# Patient Record
Sex: MALE | Race: WHITE | Employment: FULL TIME | URBAN - METROPOLITAN AREA
[De-identification: names, ages, dates, MRNs, and addresses within clinical notes are randomized per-mention and may not be internally consistent; named-entity substitution may affect disease eponyms.]

---

## 2020-10-28 ENCOUNTER — OFFICE VISIT (OUTPATIENT)
Dept: PEDIATRICS | Facility: CLINIC | Age: 40
End: 2020-10-28
Payer: COMMERCIAL

## 2020-10-28 VITALS
TEMPERATURE: 98 F | HEART RATE: 69 BPM | OXYGEN SATURATION: 96 % | WEIGHT: 216.6 LBS | BODY MASS INDEX: 32.08 KG/M2 | SYSTOLIC BLOOD PRESSURE: 122 MMHG | DIASTOLIC BLOOD PRESSURE: 74 MMHG | HEIGHT: 69 IN

## 2020-10-28 DIAGNOSIS — J45.30 MILD PERSISTENT ASTHMA WITHOUT COMPLICATION: Primary | ICD-10-CM

## 2020-10-28 DIAGNOSIS — R06.02 SOB (SHORTNESS OF BREATH): ICD-10-CM

## 2020-10-28 PROCEDURE — 99203 OFFICE O/P NEW LOW 30 MIN: CPT | Performed by: FAMILY MEDICINE

## 2020-10-28 RX ORDER — ALBUTEROL SULFATE 90 UG/1
AEROSOL, METERED RESPIRATORY (INHALATION)
Qty: 1 INHALER | Refills: 1 | Status: SHIPPED | OUTPATIENT
Start: 2020-10-28 | End: 2021-06-28

## 2020-10-28 RX ORDER — ALBUTEROL SULFATE 90 UG/1
AEROSOL, METERED RESPIRATORY (INHALATION)
COMMUNITY
Start: 2020-05-22 | End: 2020-10-28

## 2020-10-28 SDOH — HEALTH STABILITY: MENTAL HEALTH: HOW MANY STANDARD DRINKS CONTAINING ALCOHOL DO YOU HAVE ON A TYPICAL DAY?: 1 OR 2

## 2020-10-28 SDOH — HEALTH STABILITY: MENTAL HEALTH: HOW OFTEN DO YOU HAVE A DRINK CONTAINING ALCOHOL?: MONTHLY OR LESS

## 2020-10-28 SDOH — HEALTH STABILITY: MENTAL HEALTH: HOW OFTEN DO YOU HAVE 6 OR MORE DRINKS ON ONE OCCASION?: NOT ASKED

## 2020-10-28 ASSESSMENT — MIFFLIN-ST. JEOR: SCORE: 1896.83

## 2020-10-28 NOTE — PATIENT INSTRUCTIONS
Schedule for XRAY chest  Start on QVAR inhaler 2 puffs twice daily  Schedule for recheck in 4-5 weeks

## 2020-10-28 NOTE — PROGRESS NOTES
Subjective     Deuce Nguyen is a 40 year old male who presents to clinic today for the following health issues:    HPI   Establishing Care/Would like to discuss Asthma and needs a refill on Albuterol Inhaler      Deuce Nguyen is new to the provider, is here to establish care and follow-up on his asthma  He has history of asthma since age 15  Has used steroid inhalers few times in the past, but not using it in the past few years due to high cost of the medication.  He is currently using albuterol inhaler almost twice a day.  Patient reported that he was using the steroid inhaler when he found it less [place]  He denies underlying history of allergies, wheezing but feels short of breath intermittently, though it is temporarily relieved with the albuterol inhaler  He also notes that he is breathing concern does not affect his routine activities or physical exercises or sleep.  Denies heartburn, reflux, chest pain, anxiety, underlying history of anemia  Has family history of lung cancer in mother and coronary artery disease in father  Patient denies history of smoking    Asthma Follow-Up    Was ACT completed today?  Yes  No flowsheet data found.       How many days per week do you miss taking your asthma controller medication?  I do not have an asthma controller medication    Please describe any recent triggers for your asthma: None    Have you had any Emergency Room Visits, Urgent Care Visits, or Hospital Admissions since your last office visit?  No      How many servings of fruits and vegetables do you eat daily?  2-3    On average, how many sweetened beverages do you drink each day (Examples: soda, juice, sweet tea, etc.  Do NOT count diet or artificially sweetened beverages)?   0    How many days per week do you exercise enough to make your heart beat faster? 3 or less    How many minutes a day do you exercise enough to make your heart beat faster? 30 - 60    How many days per  "week do you miss taking your medication? 0    -Asthma/ Albuterol Inhaler refill.     Review of Systems   CONSTITUTIONAL: NEGATIVE for fever, chills, change in weight  EYES: NEGATIVE for vision changes or irritation  ENT/MOUTH: NEGATIVE for ear, mouth and throat problems  RESP:as above  CV: NEGATIVE for chest pain, palpitations or peripheral edema  GI: NEGATIVE for nausea, abdominal pain, heartburn, or change in bowel habits  PSYCHIATRIC: NEGATIVE for changes in mood or affect      Objective    /74 (BP Location: Right arm, Patient Position: Sitting, Cuff Size: Adult Large)   Pulse 69   Temp 98  F (36.7  C) (Temporal)   Ht 1.759 m (5' 9.25\")   Wt 98.2 kg (216 lb 9.6 oz)   SpO2 96%   BMI 31.76 kg/m    Body mass index is 31.76 kg/m .  Physical Exam   GENERAL: healthy, alert and no distress  HENT: normal cephalic/atraumatic, oropharynx clear and oral mucous membranes moist  RESP: lungs clear to auscultation - no rales, rhonchi or wheezes  CV: regular rate and rhythm, normal S1 S2, no S3 or S4, no murmur, click or rub, no peripheral edema and peripheral pulses strong  PSYCH: mentation appears normal, affect normal/bright            Assessment & Plan     Mild persistent asthma without complication  Likely causing his ongoing shortness of breath, needing improvement in asthma  Recommended patient to start on Qvar inhaler, we will see if insurance covers it if not we will consider alternate options  Continue with albuterol inhaler as needed for acute bronchospasm  Recommend to follow-up in 4 weeks for recheck  Patient verbalised understanding and is agreeable to the plan.    - beclomethasone HFA (QVAR REDIHALER) 40 MCG/ACT inhaler; Inhale 2 puffs into the lungs 2 times daily  - XR Chest 2 Views; Future    SOB (shortness of breath)  Likely from uncontrolled asthma  But given his ongoing symptoms and normal lung exam, recommended chest x-ray for further evaluation  Patient is concerned about insurance coverage, " "wants to check with insurance before getting the imaging done.  - XR Chest 2 Views; Future     BMI:   Estimated body mass index is 31.76 kg/m  as calculated from the following:    Height as of this encounter: 1.759 m (5' 9.25\").    Weight as of this encounter: 98.2 kg (216 lb 9.6 oz).            Chart documentation done in part with Dragon Voice recognition Software. Although reviewed after completion, some word and grammatical error may remain.    See Patient Instructions    No follow-ups on file.    Sneha Ni MD  [facility]    "

## 2020-10-29 ASSESSMENT — ASTHMA QUESTIONNAIRES: ACT_TOTALSCORE: 16

## 2020-11-02 RX ORDER — ALBUTEROL SULFATE 90 UG/1
AEROSOL, METERED RESPIRATORY (INHALATION)
Qty: 18 INHALER | OUTPATIENT
Start: 2020-11-02

## 2020-12-17 ENCOUNTER — OFFICE VISIT (OUTPATIENT)
Dept: FAMILY MEDICINE | Facility: CLINIC | Age: 40
End: 2020-12-17
Payer: COMMERCIAL

## 2020-12-17 VITALS — DIASTOLIC BLOOD PRESSURE: 62 MMHG | SYSTOLIC BLOOD PRESSURE: 122 MMHG

## 2020-12-17 DIAGNOSIS — A63.0 GENITAL WARTS: Primary | ICD-10-CM

## 2020-12-17 PROCEDURE — 99213 OFFICE O/P EST LOW 20 MIN: CPT | Mod: 25 | Performed by: PHYSICIAN ASSISTANT

## 2020-12-17 PROCEDURE — 54056 CRYOSURGERY PENIS LESION(S): CPT | Performed by: PHYSICIAN ASSISTANT

## 2020-12-17 NOTE — PROGRESS NOTES
HPI:  Deuce Nguyen is a 40 year old male patient here today for warts on penis .  Patient states this has been present for on and off for 10 years.  Patient reports the following symptoms: painful at times .  Patient reports the following previous treatments: ln2 with improvement but recurs. Topical but didn't work.  Patient reports the following modifying factors: none.  Associated symptoms: none.  Patient has no other skin complaints today.  Remainder of the HPI, Meds, PMH, Allergies, FH, and SH was reviewed in chart.      Past Medical History:   Diagnosis Date     Asthma        Past Surgical History:   Procedure Laterality Date     APPENDECTOMY       HERNIA REPAIR, INGUINAL RT/LT       WRIST SURGERY          Family History   Problem Relation Age of Onset     Lung Cancer Mother      Dementia Mother      Coronary Artery Disease Father      Alzheimer Disease Maternal Grandmother      Alzheimer Disease Paternal Grandmother        Social History     Socioeconomic History     Marital status: Single     Spouse name: Not on file     Number of children: Not on file     Years of education: Not on file     Highest education level: Not on file   Occupational History     Not on file   Social Needs     Financial resource strain: Not on file     Food insecurity     Worry: Not on file     Inability: Not on file     Transportation needs     Medical: Not on file     Non-medical: Not on file   Tobacco Use     Smoking status: Never Smoker     Smokeless tobacco: Never Used   Substance and Sexual Activity     Alcohol use: Yes     Frequency: Monthly or less     Drinks per session: 1 or 2     Drug use: Never     Sexual activity: Not on file   Lifestyle     Physical activity     Days per week: Not on file     Minutes per session: Not on file     Stress: Not on file   Relationships     Social connections     Talks on phone: Not on file     Gets together: Not on file     Attends [employer] service: Not on file     Active member of club or  organization: Not on file     Attends meetings of clubs or organizations: Not on file     Relationship status: Not on file     Intimate partner violence     Fear of current or ex partner: Not on file     Emotionally abused: Not on file     Physically abused: Not on file     Forced sexual activity: Not on file   Other Topics Concern     Not on file   Social History Narrative     Not on file       Outpatient Encounter Medications as of 12/17/2020   Medication Sig Dispense Refill     albuterol (PROAIR HFA/PROVENTIL HFA/VENTOLIN HFA) 108 (90 Base) MCG/ACT inhaler INHALE 1 2 PUFFS BY MOUTH EVERY 4 6 HOURS AS NEEDED FOR COUGHING OR WHEEZING. 1 Inhaler 1     beclomethasone HFA (QVAR REDIHALER) 40 MCG/ACT inhaler Inhale 2 puffs into the lungs 2 times daily 1 Inhaler 1     No facility-administered encounter medications on file as of 12/17/2020.        Review Of Systems:  Skin: warts  Eyes: negative  Ears/Nose/Throat: negative  Respiratory: No shortness of breath, dyspnea on exertion, cough, or hemoptysis  Cardiovascular: negative  Gastrointestinal: negative  Genitourinary: negative  Musculoskeletal: negative  Neurologic: negative  Psychiatric: negative  Hematologic/Lymphatic/Immunologic: negative  Endocrine: negative      Objective:     /62   Eyes: Conjunctivae/lids: Normal   ENT: Lips:  Normal  MSK: Normal  Cardiovascular: Peripheral edema none  Pulm: Breathing Normal  Neuro/Psych: Orientation: A/O x 3 Normal; Mood/Affect: Normal, NAD, WDWN  Pt accompanied by: self  Following areas examined: face ( wearing mask), penis, scrotum  Diane skin type:ii   Findings:  Flesh-colored verrucous appearing papule/s x 3 on shaft of penis  Flesh-colored verrucous appearing papule/s on left knee     Assessment and Plan:  1) genital warts x 3  Genital warts are caused by the human papillomavirus (HPV) and are spread through direct skin-to-skin contact. It is a sexually transmitted infection that typically appear months to years  after exposure. Subtypes 6 and 11 are most commonly responsible for genital warts and Subtypes 16 and 18 can cause cervical cancer.   LN2: Treated with LN2 for 5s for 1-2 cycles. Warned risks of blistering, pain, pigment change, scarring, and incomplete resolution.  Advised patient to return if lesions do not completely resolve within 2-3 months.  Wound care sheet given.    2) common warts  Treatment options include Cimetidine, Aldara, Efudex, OTC topicals, wart peel, metaplast tape, candida injection, Bleomycin, cantharidin topical, cryo therapy, excision, and electrocautery.     Pt defers treatment.     Follow up in 1 month

## 2020-12-17 NOTE — LETTER
12/17/2020         RE: Deuce Nguyen  [address] [place] [address]        Dear Colleague,    [facility]. Please see a copy of my visit note below.    HPI:  Deuce Nguyen is a 40 year old male patient here today for warts on penis .  Patient states this has been present for on and off for 10 years.  Patient reports the following symptoms: painful at times .  Patient reports the following previous treatments: ln2 with improvement but recurs. Topical but didn't work.  Patient reports the following modifying factors: none.  Associated symptoms: none.  Patient has no other skin complaints today.  Remainder of the HPI, Meds, PMH, Allergies, FH, and SH was reviewed in chart.      Past Medical History:   Diagnosis Date     Asthma        Past Surgical History:   Procedure Laterality Date     APPENDECTOMY       HERNIA REPAIR, INGUINAL RT/LT       WRIST SURGERY          Family History   Problem Relation Age of Onset     Lung Cancer Mother      Dementia Mother      Coronary Artery Disease Father      Alzheimer Disease Maternal Grandmother      Alzheimer Disease Paternal Grandmother        Social History     Socioeconomic History     Marital status: Single     Spouse name: Not on file     Number of children: Not on file     Years of education: Not on file     Highest education level: Not on file   Occupational History     Not on file   Social Needs     Financial resource strain: Not on file     Food insecurity     Worry: Not on file     Inability: Not on file     Transportation needs     Medical: Not on file     Non-medical: Not on file   Tobacco Use     Smoking status: Never Smoker     Smokeless tobacco: Never Used   Substance and Sexual Activity     Alcohol use: Yes     Frequency: Monthly or less     Drinks per session: 1 or 2     Drug use: Never     Sexual activity: Not on file   Lifestyle     Physical activity      Days per week: Not on file     Minutes per session: Not on file     Stress: Not on file   Relationships     Social connections     Talks on phone: Not on file     Gets together: Not on file     Attends Yarsani service: Not on file     Active member of club or organization: Not on file     Attends meetings of clubs or organizations: Not on file     Relationship status: Not on file     Intimate partner violence     Fear of current or ex partner: Not on file     Emotionally abused: Not on file     Physically abused: Not on file     Forced sexual activity: Not on file   Other Topics Concern     Not on file   Social History Narrative     Not on file       Outpatient Encounter Medications as of 12/17/2020   Medication Sig Dispense Refill     albuterol (PROAIR HFA/PROVENTIL HFA/VENTOLIN HFA) 108 (90 Base) MCG/ACT inhaler INHALE 1 2 PUFFS BY MOUTH EVERY 4 6 HOURS AS NEEDED FOR COUGHING OR WHEEZING. 1 Inhaler 1     beclomethasone HFA (QVAR REDIHALER) 40 MCG/ACT inhaler Inhale 2 puffs into the lungs 2 times daily 1 Inhaler 1     No facility-administered encounter medications on file as of 12/17/2020.        Review Of Systems:  Skin: warts  Eyes: negative  Ears/Nose/Throat: negative  Respiratory: No shortness of breath, dyspnea on exertion, cough, or hemoptysis  Cardiovascular: negative  Gastrointestinal: negative  Genitourinary: negative  Musculoskeletal: negative  Neurologic: negative  Psychiatric: negative  Hematologic/Lymphatic/Immunologic: negative  Endocrine: negative      Objective:     /62   Eyes: Conjunctivae/lids: Normal   ENT: Lips:  Normal  MSK: Normal  Cardiovascular: Peripheral edema none  Pulm: Breathing Normal  Neuro/Psych: Orientation: A/O x 3 Normal; Mood/Affect: Normal, NAD, WDWN  Pt accompanied by: self  Following areas examined: face ( wearing mask), penis, scrotum  Diane skin type:ii   Findings:  Flesh-colored verrucous appearing papule/s x 3 on shaft of penis  Flesh-colored verrucous  appearing papule/s on left knee     Assessment and Plan:  1) genital warts x 3  Genital warts are caused by the human papillomavirus (HPV) and are spread through direct skin-to-skin contact. It is a sexually transmitted infection that typically appear months to years after exposure. Subtypes 6 and 11 are most commonly responsible for genital warts and Subtypes 16 and 18 can cause cervical cancer.   LN2: Treated with LN2 for 5s for 1-2 cycles. Warned risks of blistering, pain, pigment change, scarring, and incomplete resolution.  Advised patient to return if lesions do not completely resolve within 2-3 months.  Wound care sheet given.    2) common warts  Treatment options include Cimetidine, Aldara, Efudex, OTC topicals, wart peel, metaplast tape, candida injection, Bleomycin, cantharidin topical, cryo therapy, excision, and electrocautery.     Pt defers treatment.     Follow up in 1 month        Again, thank you for allowing me to participate in the care of your patient.        Sincerely,        Rosie Ortiz PA-C

## 2020-12-17 NOTE — PATIENT INSTRUCTIONS
Genital warts are caused by the human papillomavirus (HPV) and are spread through direct skin-to-skin contact. It is a sexually transmitted infection that typically appear months to years after exposure. Subtypes 6 and 11 are most commonly responsible for genital warts and Subtypes 16 and 18 can cause cervical cancer.     WOUND CARE INSTRUCTIONS  FOR CRYOSURGERY        This area treated with liquid nitrogen will form a blister. You do not need to bandage the area until after the blister forms and breaks (which may be a few days).  When the blister breaks, begin daily dressing changes as follows:    1) Clean and dry the area with tap water using clean Q-tip or sterile gauze pad.    2) Apply Polysporin ointment or Bacitracin ointment over entire wound.  Do NOT use Neosporin ointment.    3) Cover the wound with a band-aid or sterile non-stick gauze pad and micropore paper tape.      REPEAT THESE INSTRUCTIONS AT LEAST ONCE A DAY UNTIL THE WOUND HAS COMPLETELY HEALED.        It is an old wives tale that a wound heals better when it is exposed to air and allowed to dry out. The wound will heal faster with a better cosmetic result if it is kept moist with ointment and covered with a bandage.  Do not let the wound dry out.      Supplies Needed:     *Cotton tipped applicators (Q-tips)   *Polysporin ointment or Bacitracin ointment (NOT NEOSPORIN)   *Band-aids, or non stick gauze pads and micropore paper tape    PATIENT INFORMATION    During the healing process you will notice a number of changes. All wounds develop a small halo of redness surrounding the wound.  This means healing is occurring. Severe itching with extensive redness usually indicates sensitivity to the ointment or bandage tape used to dress the wound.  You should call our office if this develops.      Swelling and/or discoloration around your surgical site is common, particularly when performed around the eye.    All wounds normally drain.  The larger the  wound the more drainage there will be.  After 7-10 days, you will notice the wound beginning to shrink and new skin will begin to grow.  The wound is healed when you can see skin has formed over the entire area.  A healed wound has a healthy, shiny look to the surface and is red to dark pink in color to normalize.  Wounds may take approximately 4-6 weeks to heal.  Larger wounds may take 6-8 weeks.  After the wound is healed you may discontinue dressing changes.    You may experience a sensation of tightness as your wound heals. This is normal and will gradually subside.    Your healed wound may be sensitive to temperature changes. This sensitivity improves with time, but if you re having a lot of discomfort, try to avoid temperature extremes.    Patients frequently experience itching after their wound appears to have healed because of the continue healing under the skin.  Plain Vaseline will help relieve the itching.

## 2021-06-26 DIAGNOSIS — J45.30 MILD PERSISTENT ASTHMA WITHOUT COMPLICATION: ICD-10-CM

## 2021-06-26 DIAGNOSIS — R06.02 SOB (SHORTNESS OF BREATH): ICD-10-CM

## 2021-06-26 NOTE — LETTER
June 29, 2021    Deuce Nguyen  [address] [place] [address]    Dear Deuce,       We recently received a refill request for albuterol (PROAIR HFA/PROVENTIL HFA/VENTOLIN HFA) .  We have refilled this for a one time supply only because you are due for a:    Physical & Asthma Recheck office visit for further refills.      Please call at your earliest convenience so that there will not be a delay with your future refills.          Thank you,   Your [facility] [place]  [phone]

## 2021-06-28 RX ORDER — ALBUTEROL SULFATE 90 UG/1
AEROSOL, METERED RESPIRATORY (INHALATION)
Qty: 18 G | Refills: 0 | Status: SHIPPED | OUTPATIENT
Start: 2021-06-28 | End: 2021-11-26

## 2021-06-28 NOTE — TELEPHONE ENCOUNTER
"  Routing refill request to provider for review/approval because:  See failed protocol below.   Requested Prescriptions   Pending Prescriptions Disp Refills     albuterol (PROAIR HFA/PROVENTIL HFA/VENTOLIN HFA) 108 (90 Base) MCG/ACT inhaler [Pharmacy Med Name: ALBUTEROL HFA (PROVENTIL) INH]  1     Sig: INHALE 1 2 PUFFS BY MOUTH EVERY 4 6 HOURS AS NEEDED FOR COUGHING OR WHEEZING.       Asthma Maintenance Inhalers - Anticholinergics Failed - 6/26/2021  5:58 PM        Failed - Asthma control assessment score within normal limits in last 6 months     Please review ACT score.           Failed - Recent (6 mo) or future (30 days) visit within the authorizing provider's specialty     Patient had office visit in the last 6 months or has a visit in the next 30 days with authorizing provider or within the authorizing provider's specialty.  See \"Patient Info\" tab in inbasket, or \"Choose Columns\" in Meds & Orders section of the refill encounter.            Passed - Patient is age 12 years or older        Passed - Medication is active on med list       Short-Acting Beta Agonist Inhalers Protocol  Failed - 6/26/2021  5:58 PM        Failed - Asthma control assessment score within normal limits in last 6 months     Please review ACT score.           Failed - Recent (6 mo) or future (30 days) visit within the authorizing provider's specialty     Patient had office visit in the last 6 months or has a visit in the next 30 days with authorizing provider or within the authorizing provider's specialty.  See \"Patient Info\" tab in inbasket, or \"Choose Columns\" in Meds & Orders section of the refill encounter.            Passed - Patient is age 12 or older        Passed - Medication is active on med list           Josselyn Hutchinson RN  [facility]          "

## 2021-06-29 NOTE — TELEPHONE ENCOUNTER
Patient is due for annual physical, asthma recheck-please schedule  Refill is sent as requested.

## 2025-03-21 ENCOUNTER — ANCILLARY PROCEDURE (OUTPATIENT)
Dept: GENERAL RADIOLOGY | Facility: CLINIC | Age: 45
End: 2025-03-21
Attending: STUDENT IN AN ORGANIZED HEALTH CARE EDUCATION/TRAINING PROGRAM
Payer: COMMERCIAL

## 2025-03-21 DIAGNOSIS — M25.562 CHRONIC PAIN OF LEFT KNEE: ICD-10-CM

## 2025-03-21 DIAGNOSIS — G89.29 CHRONIC PAIN OF LEFT KNEE: ICD-10-CM

## 2025-03-21 PROCEDURE — 73562 X-RAY EXAM OF KNEE 3: CPT | Mod: TC | Performed by: RADIOLOGY

## 2025-04-02 ENCOUNTER — OFFICE VISIT (OUTPATIENT)
Dept: ORTHOPEDICS | Facility: CLINIC | Age: 45
End: 2025-04-02
Payer: COMMERCIAL

## 2025-04-02 ENCOUNTER — ANCILLARY PROCEDURE (OUTPATIENT)
Dept: GENERAL RADIOLOGY | Facility: CLINIC | Age: 45
End: 2025-04-02
Attending: PEDIATRICS
Payer: COMMERCIAL

## 2025-04-02 DIAGNOSIS — G89.29 CHRONIC RIGHT SHOULDER PAIN: ICD-10-CM

## 2025-04-02 DIAGNOSIS — M25.511 CHRONIC RIGHT SHOULDER PAIN: ICD-10-CM

## 2025-04-02 DIAGNOSIS — M75.101 ROTATOR CUFF SYNDROME OF RIGHT SHOULDER: Primary | ICD-10-CM

## 2025-04-02 PROCEDURE — 73030 X-RAY EXAM OF SHOULDER: CPT | Mod: TC | Performed by: RADIOLOGY

## 2025-04-02 PROCEDURE — 99214 OFFICE O/P EST MOD 30 MIN: CPT | Performed by: PEDIATRICS

## 2025-04-02 NOTE — PATIENT INSTRUCTIONS
Right shoulder issues favored to be rotator cuff source. Tear is not favored given overall function, though not excluded with duration of symptoms, some limitation in motion on exam.  We discussed:  --additional imaging with MRI  --physical therapy  --trial of subacromial steroid injection    Following discussion, physical therapy referral placed.  Monitor 4-6 weeks with PT.  If improving, follow-up is open ended.  Otherwise, contact clinic if any questions/concerns, with additional options as noted above.    If you have any further questions for your physician or physician s care team you can contact them thru CASTTt or by calling [phone].

## 2025-04-02 NOTE — PROGRESS NOTES
ASSESSMENT & PLAN    Deuce was seen today for pain.    Diagnoses and all orders for this visit:    Rotator cuff syndrome of right shoulder  -     Physical Therapy  Referral; Future    Chronic right shoulder pain  -     XR Shoulder Right G/E 3 Views; Future  -     Physical Therapy  Referral; Future        See Patient Instructions  Patient Instructions   Right shoulder issues favored to be rotator cuff source. Tear is not favored given overall function, though not excluded with duration of symptoms, some limitation in motion on exam.  We discussed:  --additional imaging with MRI  --physical therapy  --trial of subacromial steroid injection    Following discussion, physical therapy referral placed.  Monitor 4-6 weeks with PT.  If improving, follow-up is open ended.  Otherwise, contact clinic if any questions/concerns, with additional options as noted above.    If you have any further questions for your physician or physician s care team you can contact them thru MyChart or by calling [phone].      Harjinder Eduardo [facility]    -----  Chief Complaint   Patient presents with    Right Shoulder - Pain       SUBJECTIVE  Deuce MAYKEL Nguyen is a/an 44 year old male who is seen as a self referral for evaluation of right shoulder.     The patient is seen with his children.  The patient is Ambidextrous handed    Onset: 7 month(s) ago. Reports insidious onset without acute precipitating event. Reports history of RTC and home exercises which helped, but now pain is constant  Location of Pain: right shoulder, diffuse, anterior and posterior  Worsened by: Extension, reaching behind, describes pinching with adduction, reaching above  Better with:   Treatments tried: HEP, Advil, Tylenol, icyhot  Associated symptoms: no distal numbness or tingling; denies swelling or warmth    Orthopedic/Surgical history: NO  Social History/Occupation: ; enjoys playing  hockey, [employer]     **  Above information per rooming staff.  Additional history:  Has had some treatment for rotator cuff issues in past, including having done PT in past.  Now increasing in symptoms with time.  Past few months is reminiscent of past left shoulder issues, though that improved with therapy.  No clear right shoulder noise.      REVIEW OF SYSTEMS:  Review of Systems    OBJECTIVE:        Right Shoulder exam    ROM:      forward flexion grossly symmetric, pain anteriorly        abduction grossly symmetric, pain laterally       internal rotation thumb mid lumbar, limited by multiple vertebral semgnets compared to left       external rotation mild - mod limitation with pain    Strength:      abduction 5/5       internal rotation 5/5       external rotation 4/5  Pain with ER    Impingement testing:      positive (+) Munoz       positive (+) empty can    Skin:      no visible deformities       well perfused       capillary refill brisk    Sensation:      normal sensation over shoulder and upper extremity       RADIOLOGY:  Final results and radiologist's interpretation, available in the [facility] health record.  Images were reviewed with the patient in the office today.  My personal interpretation of the performed imaging: no acute bony abnormality noted. GH and AC joint spaces appear preserved.        Recent Results (from the past 24 hours)   XR Shoulder Right G/E 3 Views    Narrative    EXAM: XR SHOULDER RIGHT G/E 3 VIEWS  LOCATION: [facility] [place]  DATE: 4/2/2025    INDICATION: Diffuse right shoulder pain  COMPARISON: None.      Impression    IMPRESSION: Normal joint spaces and alignment. No fracture.

## 2025-04-02 NOTE — LETTER
4/2/2025      Deuce Nguyen  [address] [facility] [id number]      Dear Colleague,    Thank you for referring your patient, Deuce Nguyen, to the [facility]. Please see a copy of my visit note below.    ASSESSMENT & PLAN    Deuce was seen today for pain.    Diagnoses and all orders for this visit:    Rotator cuff syndrome of right shoulder  -     Physical Therapy  Referral; Future    Chronic right shoulder pain  -     XR Shoulder Right G/E 3 Views; Future  -     Physical Therapy  Referral; Future        See Patient Instructions  Patient Instructions   Right shoulder issues favored to be rotator cuff source. Tear is not favored given overall function, though not excluded with duration of symptoms, some limitation in motion on exam.  We discussed:  --additional imaging with MRI  --physical therapy  --trial of subacromial steroid injection    Following discussion, physical therapy referral placed.  Monitor 4-6 weeks with PT.  If improving, follow-up is open ended.  Otherwise, contact clinic if any questions/concerns, with additional options as noted above.    If you have any further questions for your physician or physician s care team you can contact them thru MyChart or by calling [phone].      Harjinder Eduardo DO  [facility]    -----  Chief Complaint   Patient presents with     Right Shoulder - Pain       SUBJECTIVE  Deuce Nguyen is a/an 44 year old male who is seen as a self referral for evaluation of right shoulder.     The patient is seen with his children.  The patient is Ambidextrous handed    Onset: 7 month(s) ago. Reports insidious onset without acute precipitating event. Reports history of RTC and home exercises which helped, but now pain is constant  Location of Pain: right shoulder, diffuse, anterior and posterior  Worsened by: Extension, reaching behind, describes pinching with adduction,  reaching above  Better with:   Treatments tried: HEP, Advil, Tylenol, icyhot  Associated symptoms: no distal numbness or tingling; denies swelling or warmth    Orthopedic/Surgical history: NO  Social History/Occupation: ; enjoys playing [employer], [place]     **  Above information per rooming staff.  Additional history:  Has had some treatment for rotator cuff issues in past, including having done PT in past.  Now increasing in symptoms with time.  Past few months is reminiscent of past left shoulder issues, though that improved with therapy.  No clear right shoulder noise.      REVIEW OF SYSTEMS:  Review of Systems    OBJECTIVE:        Right Shoulder exam    ROM:      forward flexion grossly symmetric, pain anteriorly        abduction grossly symmetric, pain laterally       internal rotation thumb mid lumbar, limited by multiple vertebral semgnets compared to left       external rotation mild - mod limitation with pain    Strength:      abduction 5/5       internal rotation 5/5       external rotation 4/5  Pain with ER    Impingement testing:      positive (+) Munoz       positive (+) empty can    Skin:      no visible deformities       well perfused       capillary refill brisk    Sensation:      normal sensation over shoulder and upper extremity       RADIOLOGY:  Final results and radiologist's interpretation, available in the [facility] health record.  Images were reviewed with the patient in the office today.  My personal interpretation of the performed imaging: no acute bony abnormality noted. GH and AC joint spaces appear preserved.        Recent Results (from the past 24 hours)   XR Shoulder Right G/E 3 Views    Narrative    EXAM: XR SHOULDER RIGHT G/E 3 VIEWS  LOCATION: [facility]  DATE: 4/2/2025    INDICATION: Diffuse right shoulder pain  COMPARISON: None.      Impression    IMPRESSION: Normal joint spaces and alignment. No fracture.             Again, thank you  for allowing me to participate in the care of your patient.        Sincerely,        Harjinder Eduardo, DO    Electronically signed

## 2025-04-08 ENCOUNTER — ANCILLARY PROCEDURE (OUTPATIENT)
Dept: MRI IMAGING | Facility: CLINIC | Age: 45
End: 2025-04-08
Attending: STUDENT IN AN ORGANIZED HEALTH CARE EDUCATION/TRAINING PROGRAM
Payer: COMMERCIAL

## 2025-04-08 DIAGNOSIS — G89.29 CHRONIC PAIN OF LEFT KNEE: ICD-10-CM

## 2025-04-08 DIAGNOSIS — M25.562 CHRONIC PAIN OF LEFT KNEE: ICD-10-CM

## 2025-04-08 PROCEDURE — 73721 MRI JNT OF LWR EXTRE W/O DYE: CPT | Mod: LT | Performed by: RADIOLOGY

## 2025-04-23 ENCOUNTER — THERAPY VISIT (OUTPATIENT)
Dept: PHYSICAL THERAPY | Facility: CLINIC | Age: 45
End: 2025-04-23
Attending: PEDIATRICS
Payer: COMMERCIAL

## 2025-04-23 DIAGNOSIS — M25.511 CHRONIC RIGHT SHOULDER PAIN: ICD-10-CM

## 2025-04-23 DIAGNOSIS — M75.101 ROTATOR CUFF SYNDROME OF RIGHT SHOULDER: ICD-10-CM

## 2025-04-23 DIAGNOSIS — G89.29 CHRONIC PAIN OF LEFT KNEE: ICD-10-CM

## 2025-04-23 DIAGNOSIS — M25.562 CHRONIC PAIN OF LEFT KNEE: ICD-10-CM

## 2025-04-23 DIAGNOSIS — G89.29 CHRONIC RIGHT SHOULDER PAIN: ICD-10-CM

## 2025-04-23 PROCEDURE — 97110 THERAPEUTIC EXERCISES: CPT | Mod: GP | Performed by: PHYSICAL THERAPIST

## 2025-04-23 PROCEDURE — 97161 PT EVAL LOW COMPLEX 20 MIN: CPT | Mod: GP | Performed by: PHYSICAL THERAPIST

## 2025-04-23 ASSESSMENT — ACTIVITIES OF DAILY LIVING (ADL)
REMOVING_SOMETHING_FROM_YOUR_BACK_POCKET: 2
WASHING_YOUR_BACK: 7
REACHING_FOR_SOMETHING_ON_A_HIGH_SHELF: 4
PUSHING_WITH_THE_INVOLVED_ARM: 4
TOUCHING_THE_BACK_OF_YOUR_NECK: 3
CARRYING_A_HEAVY_OBJECT_OF_10_POUNDS: 4
WASHING_YOUR_HAIR?: 1
PLEASE_INDICATE_YOR_PRIMARY_REASON_FOR_REFERRAL_TO_THERAPY:: SHOULDER
PLACING_AN_OBJECT_ON_A_HIGH_SHELF: 4
WHEN_LYING_ON_THE_INVOLVED_SIDE: 4
AT_ITS_WORST?: 6
PUTTING_ON_A_SHIRT_THAT_BUTTONS_DOWN_THE_FRONT: 7
PUTTING_ON_AN_UNDERSHIRT_OR_A_PULLOVER_SWEATER: 7
PUTTING_ON_YOUR_PANTS: 0

## 2025-04-23 NOTE — PROGRESS NOTES
"PHYSICAL THERAPY EVALUATION  Type of Visit: Evaluation       Fall Risk Screen:  Have you fallen 2 or more times in the past year?: No  Have you fallen and had an injury in the past year?: No    Subjective         Presenting condition or subjective complaint: shoulder pain  Date of onset: 04/02/25 (referred to PT)    Relevant medical history: Arthritis; Asthma; Depression; Neck injury   Dates & types of surgery: hernia    Pt describes off and on shoulder problems for 20 plus years.  Usually notes ju-jistu to be problematic.  Typically does \"standard rehab things\" and ibuprofen.  Current episode lasting longer than expected for about six to eight months.  \"Rested\" for the past month and it didn't help.  Pain is R anterolateral shoulder.  \"Anything behind me is a real problem.\"  Worse lifting the R arm into elevation, \"anytime my arm is away from my body,\" rotating the arm outward, working with mouse on R side.  Hasn't found anything specific that makes it feel better.  No particular pattern re: time of day.  Staying the same.    Prior diagnostic imaging/testing results: X-ray     Prior therapy history for the same diagnosis, illness or injury: Yes rehab    Prior Level of Function  Transfers: Independent  Ambulation: Independent  ADL: Independent    Living Environment  Social support: With family members   Type of home: House   Stairs to enter the home: No       Ramp: No   Stairs inside the home: Yes 12 Is there a railing: Yes     Help at home: None  Equipment owned:       Employment: Yes   Hobbies/Interests: deon flanaganu,hockey,biking    Patient goals for therapy: live confortably and play sports       Objective   SHOULDER EVALUATION  PAIN: see above  POSTURE: forward head and rounded shoulders  GAIT: pt ambulates without device without gross asymmetry  ROM: AROM L shoulder flexion 165, extension 56, abduction 158, IR T8, ER 68.  AROM / PROM R shoulder flexion 154/159, extension 70/DNT, abduction 148/DNT, " IR L4/L3 capsular, ER 51/55 capsular.  STRENGTH: no gross strength asymmetry but pt noted pain with resisted R shoulder abduction, ER.  SPECIAL TESTS: negative ERLS in 30/30/30  PALPATION: no tenderness to palpation  JOINT MOBILITY: GH hypomobility anterior, posterior, inferior on R compared to L  CERVICAL SCREEN: negative cervical all direction    Assessment & Plan   CLINICAL IMPRESSIONS  Medical Diagnosis: Chronic right shoulder pain  Rotator cuff syndrome of right shoulder    Treatment Diagnosis: R shoulder pain   Impression/Assessment: Patient is a 44 year old male with shoulder complaints.  The following significant findings have been identified: Pain, Decreased ROM/flexibility, Decreased joint mobility, Decreased activity tolerance, and Impaired posture. These impairments interfere with their ability to perform self care tasks as compared to previous level of function.     Clinical Decision Making (Complexity):  Clinical Presentation: Stable/Uncomplicated  Clinical Presentation Rationale: based on medical and personal factors listed in PT evaluation  Clinical Decision Making (Complexity): Low complexity    PLAN OF CARE  Treatment Interventions:  Interventions: Manual Therapy, Neuromuscular Re-education, Therapeutic Activity, Therapeutic Exercise    Long Term Goals     PT Goal 1  Goal Description: 1/10 pain reaching to high shelf  Rationale: to maximize safety and independence with performance of ADLs and functional tasks  Goal Progress: 4/10 pain reaching to high shelf  Target Date: 06/04/25      Frequency of Treatment: once per week  Duration of Treatment: six weeks      Education Assessment:   Learner/Method: Patient  Education Comments: Performance in clinic    Risks and benefits of evaluation/treatment have been explained.   Patient/Family/caregiver agrees with Plan of Care.     Evaluation Time:     PT Eval, Low Complexity Minutes (73617): 25       Signing Clinician: Aric Paula, PT

## 2025-05-01 ENCOUNTER — THERAPY VISIT (OUTPATIENT)
Dept: PHYSICAL THERAPY | Facility: CLINIC | Age: 45
End: 2025-05-01
Attending: PEDIATRICS
Payer: COMMERCIAL

## 2025-05-01 DIAGNOSIS — M25.511 CHRONIC RIGHT SHOULDER PAIN: Primary | ICD-10-CM

## 2025-05-01 DIAGNOSIS — M75.101 ROTATOR CUFF SYNDROME OF RIGHT SHOULDER: ICD-10-CM

## 2025-05-01 DIAGNOSIS — G89.29 CHRONIC RIGHT SHOULDER PAIN: Primary | ICD-10-CM

## 2025-05-15 ENCOUNTER — THERAPY VISIT (OUTPATIENT)
Dept: PHYSICAL THERAPY | Facility: CLINIC | Age: 45
End: 2025-05-15
Payer: COMMERCIAL

## 2025-05-15 DIAGNOSIS — M75.101 ROTATOR CUFF SYNDROME OF RIGHT SHOULDER: ICD-10-CM

## 2025-05-15 DIAGNOSIS — M25.511 CHRONIC RIGHT SHOULDER PAIN: Primary | ICD-10-CM

## 2025-05-15 DIAGNOSIS — G89.29 CHRONIC RIGHT SHOULDER PAIN: Primary | ICD-10-CM
